# Patient Record
Sex: FEMALE
[De-identification: names, ages, dates, MRNs, and addresses within clinical notes are randomized per-mention and may not be internally consistent; named-entity substitution may affect disease eponyms.]

---

## 2019-05-18 ENCOUNTER — RX ONLY (RX ONLY)
Age: 39
End: 2019-05-18

## 2023-05-22 ENCOUNTER — APPOINTMENT (OUTPATIENT)
Dept: URBAN - METROPOLITAN AREA CLINIC 226 | Age: 43
Setting detail: DERMATOLOGY
End: 2023-05-22

## 2023-05-22 DIAGNOSIS — L29.8 OTHER PRURITUS: ICD-10-CM

## 2023-05-22 DIAGNOSIS — L30.9 DERMATITIS, UNSPECIFIED: ICD-10-CM

## 2023-05-22 PROCEDURE — OTHER PRESCRIPTION MEDICATION MANAGEMENT: OTHER

## 2023-05-22 PROCEDURE — OTHER BIOPSY BY SHAVE METHOD: OTHER

## 2023-05-22 PROCEDURE — OTHER MIPS QUALITY: OTHER

## 2023-05-22 PROCEDURE — OTHER COUNSELING: OTHER

## 2023-05-22 PROCEDURE — OTHER MEDICATION COUNSELING: OTHER

## 2023-05-22 PROCEDURE — 11102 TANGNTL BX SKIN SINGLE LES: CPT

## 2023-05-22 PROCEDURE — OTHER PRESCRIPTION: OTHER

## 2023-05-22 PROCEDURE — 99202 OFFICE O/P NEW SF 15 MIN: CPT | Mod: 25

## 2023-05-22 PROCEDURE — OTHER TREATMENT REGIMEN: OTHER

## 2023-05-22 RX ORDER — TACROLIMUS 1 MG/G
OINTMENT TOPICAL
Qty: 100 | Refills: 1 | Status: ERX | COMMUNITY
Start: 2023-05-22

## 2023-05-22 ASSESSMENT — LOCATION DETAILED DESCRIPTION DERM: LOCATION DETAILED: RIGHT PROXIMAL DORSAL FOREARM

## 2023-05-22 ASSESSMENT — LOCATION SIMPLE DESCRIPTION DERM: LOCATION SIMPLE: RIGHT FOREARM

## 2023-05-22 ASSESSMENT — LOCATION ZONE DERM: LOCATION ZONE: ARM

## 2023-05-22 NOTE — PROCEDURE: TREATMENT REGIMEN
Plan: Patient states that she had food and environmental, allergies blood work done, which was all normal, she also mentioned she had patch testing, which was also normal. 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 Will consider additional bloodwork if needed based on biopsy results. Plan: Patient states that she had food and environmental, allergies blood work done, which was all normal, she also mentioned she had patch testing, which was also normal. 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 Will consider additional bloodwork if needed based on biopsy results.

## 2023-05-22 NOTE — PROCEDURE: MEDICATION COUNSELING
Xeltawanaz Pregnancy And Lactation Text: This medication is Pregnancy Category D and is not considered safe during pregnancy.  The risk during breast feeding is also uncertain.

## 2023-05-22 NOTE — PROCEDURE: BIOPSY BY SHAVE METHOD
Detail Level: Detailed
Depth Of Biopsy: dermis
Was A Bandage Applied: Yes
Size Of Lesion In Cm: 0
Biopsy Type: H and E
Biopsy Method: Dermablade
Anesthesia Type: 1% lidocaine with epinephrine
Anesthesia Volume In Cc (Will Not Render If 0): 1
Hemostasis: Rao's
Wound Care: Petrolatum
Dressing: bandage
Destruction After The Procedure: No
Type Of Destruction Used: Curettage
Curettage Text: The wound bed was treated with curettage after the biopsy was performed.
Cryotherapy Text: The wound bed was treated with cryotherapy after the biopsy was performed.
Electrodesiccation Text: The wound bed was treated with electrodesiccation after the biopsy was performed.
Electrodesiccation And Curettage Text: The wound bed was treated with electrodesiccation and curettage after the biopsy was performed.
Silver Nitrate Text: The wound bed was treated with silver nitrate after the biopsy was performed.
Lab: -[id number]
Consent: Written consent was obtained and risks were reviewed including but not limited to scarring, infection, bleeding, scabbing, incomplete removal, nerve damage and allergy to anesthesia.
Post-Care Instructions: I reviewed with the patient in detail post-care instructions. Patient is to keep the biopsy site dry overnight, and then apply bacitracin twice daily until healed. Patient may apply hydrogen peroxide soaks to remove any crusting.
Notification Instructions: Patient will be notified of biopsy results. However, patient instructed to call the office if not contacted within 2 weeks.
Billing Type: Third-Party Bill
Information: Selecting Yes will display possible errors in your note based on the variables you have selected. This validation is only offered as a suggestion for you. PLEASE NOTE THAT THE VALIDATION TEXT WILL BE REMOVED WHEN YOU FINALIZE YOUR NOTE. IF YOU WANT TO FAX A PRELIMINARY NOTE YOU WILL NEED TO TOGGLE THIS TO 'NO' IF YOU DO NOT WANT IT IN YOUR FAXED NOTE.

## 2023-05-22 NOTE — PROCEDURE: MEDICATION COUNSELING
Detail Level: Detailed Date Of Surgery (Mm/Dd/Yyyy): 08/17/2017 Surgery Global Period: 90 Cyclophosphamide Counseling:  I discussed with the patient the risks of cyclophosphamide including but not limited to hair loss, hormonal abnormalities, decreased fertility, abdominal pain, diarrhea, nausea and vomiting, bone marrow suppression and infection. The patient understands that monitoring is required while taking this medication.

## 2023-05-22 NOTE — HPI: RASH
How Severe Is Your Rash?: severe
Is This A New Presentation, Or A Follow-Up?: Rash
Additional History: Patient states that an allergist told her she is allergic to the Ozone layer 3 and fragrances. She states that she didn’t have any testing done because she wouldn’t be able to find the name on labels to which she is allergic to.

## 2023-06-05 ENCOUNTER — APPOINTMENT (OUTPATIENT)
Dept: URBAN - METROPOLITAN AREA CLINIC 226 | Age: 43
Setting detail: DERMATOLOGY
End: 2023-06-05

## 2023-06-05 DIAGNOSIS — L30.9 DERMATITIS, UNSPECIFIED: ICD-10-CM

## 2023-06-05 DIAGNOSIS — L29.8 OTHER PRURITUS: ICD-10-CM

## 2023-06-05 PROCEDURE — OTHER PRESCRIPTION MEDICATION MANAGEMENT: OTHER

## 2023-06-05 PROCEDURE — 99213 OFFICE O/P EST LOW 20 MIN: CPT

## 2023-06-05 PROCEDURE — OTHER MIPS QUALITY: OTHER

## 2023-06-05 PROCEDURE — OTHER COUNSELING: OTHER

## 2023-06-05 PROCEDURE — OTHER PRESCRIPTION: OTHER

## 2023-06-05 PROCEDURE — OTHER TREATMENT REGIMEN: OTHER

## 2023-06-05 PROCEDURE — OTHER REVIEW OF OUTSIDE PATHOLOGY REPORTS: OTHER

## 2023-06-05 PROCEDURE — OTHER MEDICATION COUNSELING: OTHER

## 2023-06-05 RX ORDER — RUXOLITINIB 15 MG/G
CREAM TOPICAL
Qty: 60 | Refills: 3 | Status: ERX | COMMUNITY
Start: 2023-06-05

## 2023-06-05 NOTE — PROCEDURE: MEDICATION COUNSELING
You can access the FollowMyHealth Patient Portal offered by [facility] by registering at the following website: [email or web address]. By joining [facility]’s FollowMyHealth portal, you will also be able to view your health information using other applications (apps) compatible with our system. Winlevi Pregnancy And Lactation Text: This medication is considered safe during pregnancy and breastfeeding.

## 2023-06-05 NOTE — PROCEDURE: COUNSELING
Detail Level: Generalized
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
Detail Level: Detailed

## 2023-06-05 NOTE — PROCEDURE: TREATMENT REGIMEN
Plan: Patient is convinced that she is allergic to the sun and basically all fragrance. It is entirely possible she has photosensitivity, so patient is going to attempt to avoid sun at all cost: staying indoors, using sun screen,  etc.  she will continue staying away from fragrances. 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

## 2023-06-05 NOTE — PROCEDURE: TREATMENT REGIMEN
Plan: Patient states that she had food and environmental allergy blood work done in [place] 19 years ago, which was all normal, she also mentioned she had patch testing, which was also normal. She believes she has a severe allergy to fragrance in general as well as “Ozone” due to flare up’s during “pollution warnings”. She has an order from her primary care for updated food and [place], environmental allergies, bloodwork that she is doing later today. She will bring in the results to our next appointment. Plan: Patient states that she had food and environmental allergy blood work done in [place] 19 years ago, which was all normal, she also mentioned she had patch testing, which was also normal. She believes she has a severe allergy to fragrance in general as well as “[place]zone” due to flare up’s during “pollution warnings”. She has an order from her primary care for updated food and [place], environmental allergies, bloodwork that she is doing later today. She will bring in the results to our next appointment.

## 2023-08-07 NOTE — PROCEDURE: MEDICATION COUNSELING
Faxed order for Bilateral diagnostic mammogram and left breast ultrasound to [facility] Radiology scheduling for patient to be scheduled @ [facility]. Confirmation received. Clindamycin Pregnancy And Lactation Text: This medication can be used in pregnancy if certain situations. Clindamycin is also present in breast milk.